# Patient Record
Sex: FEMALE | Race: WHITE | NOT HISPANIC OR LATINO | Employment: OTHER | URBAN - METROPOLITAN AREA
[De-identification: names, ages, dates, MRNs, and addresses within clinical notes are randomized per-mention and may not be internally consistent; named-entity substitution may affect disease eponyms.]

---

## 2017-09-26 ENCOUNTER — TRANSCRIBE ORDERS (OUTPATIENT)
Dept: MAMMOGRAPHY | Facility: HOSPITAL | Age: 62
End: 2017-09-26

## 2017-09-26 DIAGNOSIS — Z12.31 VISIT FOR SCREENING MAMMOGRAM: Primary | ICD-10-CM

## 2017-10-27 ENCOUNTER — APPOINTMENT (OUTPATIENT)
Dept: MAMMOGRAPHY | Facility: HOSPITAL | Age: 62
End: 2017-10-27
Attending: OBSTETRICS & GYNECOLOGY

## 2017-12-12 ENCOUNTER — APPOINTMENT (OUTPATIENT)
Dept: MAMMOGRAPHY | Facility: HOSPITAL | Age: 62
End: 2017-12-12
Attending: OBSTETRICS & GYNECOLOGY

## 2018-01-26 ENCOUNTER — APPOINTMENT (OUTPATIENT)
Dept: MAMMOGRAPHY | Facility: HOSPITAL | Age: 63
End: 2018-01-26
Attending: OBSTETRICS & GYNECOLOGY

## 2018-03-13 ENCOUNTER — HOSPITAL ENCOUNTER (OUTPATIENT)
Dept: MAMMOGRAPHY | Facility: HOSPITAL | Age: 63
Discharge: HOME | End: 2018-03-13
Attending: OBSTETRICS & GYNECOLOGY | Admitting: OBSTETRICS & GYNECOLOGY

## 2018-03-13 DIAGNOSIS — R92.8 ABNORMAL MAMMOGRAM: ICD-10-CM

## 2018-03-13 DIAGNOSIS — Z12.31 VISIT FOR SCREENING MAMMOGRAM: ICD-10-CM

## 2018-03-13 PROCEDURE — G0279 TOMOSYNTHESIS, MAMMO: HCPCS

## 2018-03-13 PROCEDURE — 77066 DX MAMMO INCL CAD BI: CPT | Performed by: RADIOLOGY

## 2018-03-13 PROCEDURE — 77066 DX MAMMO INCL CAD BI: CPT

## 2018-03-13 PROCEDURE — 77062 BREAST TOMOSYNTHESIS BI: CPT | Performed by: RADIOLOGY

## 2019-04-22 ENCOUNTER — TRANSCRIBE ORDERS (OUTPATIENT)
Dept: ADMINISTRATIVE | Facility: HOSPITAL | Age: 64
End: 2019-04-22

## 2019-04-22 DIAGNOSIS — Z12.31 VISIT FOR SCREENING MAMMOGRAM: Primary | ICD-10-CM

## 2019-06-14 ENCOUNTER — HOSPITAL ENCOUNTER (OUTPATIENT)
Dept: MAMMOGRAPHY | Facility: HOSPITAL | Age: 64
Discharge: HOME | End: 2019-06-14
Admitting: OBSTETRICS & GYNECOLOGY

## 2019-06-14 DIAGNOSIS — Z12.31 VISIT FOR SCREENING MAMMOGRAM: ICD-10-CM

## 2019-06-14 PROCEDURE — 77063 BREAST TOMOSYNTHESIS BI: CPT | Performed by: RADIOLOGY

## 2019-06-14 PROCEDURE — 77063 BREAST TOMOSYNTHESIS BI: CPT

## 2019-06-14 PROCEDURE — 77067 SCR MAMMO BI INCL CAD: CPT | Performed by: RADIOLOGY

## 2019-06-14 PROCEDURE — 77067 SCR MAMMO BI INCL CAD: CPT

## 2021-05-28 ENCOUNTER — TRANSCRIBE ORDERS (OUTPATIENT)
Dept: ADMINISTRATIVE | Facility: HOSPITAL | Age: 66
End: 2021-05-28

## 2021-05-28 ENCOUNTER — HOSPITAL ENCOUNTER (OUTPATIENT)
Dept: GENERAL RADIOLOGY | Facility: HOSPITAL | Age: 66
Discharge: HOME | End: 2021-05-28
Admitting: INTERNAL MEDICINE

## 2021-05-28 DIAGNOSIS — M54.2 CERVICALGIA: Primary | ICD-10-CM

## 2021-05-28 PROCEDURE — 72050 X-RAY EXAM NECK SPINE 4/5VWS: CPT

## 2021-06-02 ENCOUNTER — TRANSCRIBE ORDERS (OUTPATIENT)
Dept: ADMINISTRATIVE | Facility: HOSPITAL | Age: 66
End: 2021-06-02

## 2021-06-02 DIAGNOSIS — M50.90 CERVICAL DISC DISEASE: Primary | ICD-10-CM

## 2021-06-29 ENCOUNTER — HOSPITAL ENCOUNTER (OUTPATIENT)
Dept: MRI IMAGING | Facility: HOSPITAL | Age: 66
Discharge: HOME | End: 2021-06-29
Admitting: INTERNAL MEDICINE

## 2021-06-29 DIAGNOSIS — M50.90 CERVICAL DISC DISEASE: ICD-10-CM

## 2021-06-29 PROCEDURE — 72141 MRI NECK SPINE W/O DYE: CPT

## 2021-07-02 ENCOUNTER — TRANSCRIBE ORDERS (OUTPATIENT)
Dept: ADMINISTRATIVE | Facility: HOSPITAL | Age: 66
End: 2021-07-02

## 2021-07-02 ENCOUNTER — TRANSCRIBE ORDERS (OUTPATIENT)
Dept: OBSTETRICS AND GYNECOLOGY | Facility: CLINIC | Age: 66
End: 2021-07-02

## 2021-07-02 DIAGNOSIS — Z12.31 VISIT FOR SCREENING MAMMOGRAM: Primary | ICD-10-CM

## 2021-07-12 ENCOUNTER — HOSPITAL ENCOUNTER (OUTPATIENT)
Dept: MAMMOGRAPHY | Facility: HOSPITAL | Age: 66
Discharge: HOME | End: 2021-07-12
Admitting: OBSTETRICS & GYNECOLOGY

## 2021-07-12 DIAGNOSIS — Z12.31 VISIT FOR SCREENING MAMMOGRAM: ICD-10-CM

## 2021-07-12 PROCEDURE — 77063 BREAST TOMOSYNTHESIS BI: CPT

## 2021-07-12 PROCEDURE — 77063 BREAST TOMOSYNTHESIS BI: CPT | Performed by: RADIOLOGY

## 2021-07-12 PROCEDURE — 77067 SCR MAMMO BI INCL CAD: CPT

## 2021-07-12 PROCEDURE — 77067 SCR MAMMO BI INCL CAD: CPT | Performed by: RADIOLOGY

## 2021-08-02 ENCOUNTER — TELEPHONE (OUTPATIENT)
Dept: NEUROSURGERY | Facility: CLINIC | Age: 66
End: 2021-08-02

## 2021-08-02 NOTE — TELEPHONE ENCOUNTER
Provider:  Blaise  Caller: patient  Time of call:   3:17  Phone #:  [phone]  Surgery:  No NEW PATIENT  Surgery Date:    Last visit:     Next visit: tomorrow    SINGH:         Reason for call:    Patient just got back from [facility] and believes she may be positive for covid.  She was tested prior to leaving and she was negative.    She is seeing her PCP today and will be tested.

## 2021-08-20 ENCOUNTER — OFFICE VISIT (OUTPATIENT)
Dept: NEUROSURGERY | Facility: CLINIC | Age: 66
End: 2021-08-20

## 2021-08-20 VITALS — WEIGHT: 199.4 LBS | BODY MASS INDEX: 29.53 KG/M2 | TEMPERATURE: 97.1 F | HEIGHT: 69 IN

## 2021-08-20 DIAGNOSIS — M50.30 DDD (DEGENERATIVE DISC DISEASE), CERVICAL: ICD-10-CM

## 2021-08-20 DIAGNOSIS — M47.812 CERVICAL SPONDYLOSIS: ICD-10-CM

## 2021-08-20 DIAGNOSIS — M48.02 CERVICAL SPINAL STENOSIS: ICD-10-CM

## 2021-08-20 DIAGNOSIS — M54.2 NECK PAIN: Primary | ICD-10-CM

## 2021-08-20 PROCEDURE — 99203 OFFICE O/P NEW LOW 30 MIN: CPT | Performed by: NEUROLOGICAL SURGERY

## 2021-08-20 RX ORDER — MONTELUKAST SODIUM 4 MG/1
1 TABLET, CHEWABLE ORAL 3 TIMES DAILY
COMMUNITY
End: 2023-03-20

## 2021-08-20 NOTE — PROGRESS NOTES
"Patient: Talia Maddox  : 1955    Primary Care Provider: Sharda Valentine MD    Requesting Provider: As above        History    Chief Complaint: Chronic neck pain.    History of Present Illness: Ms. Maddox is a 66-year-old  who since the mid  has had bothersome neck pain.  In March she had a severe flareup that lasted overnight.  She denies any Lhermitte's phenomenon.  She has no arm symptoms including pain, weakness, sensory alteration.  She has pursued some \"magnetic therapies\" for some time.  Stress makes her symptoms worse.  She has done some recent physical therapy which was helpful.  She denies bowel or bladder dysfunction.    Review of Systems   Constitutional: Negative for activity change, appetite change, chills, diaphoresis, fatigue, fever and unexpected weight change.   HENT: Negative for congestion, dental problem, drooling, ear discharge, ear pain, facial swelling, hearing loss, mouth sores, nosebleeds, postnasal drip, rhinorrhea, sinus pressure, sinus pain, sneezing, sore throat, tinnitus, trouble swallowing and voice change.    Eyes: Positive for pain and redness. Negative for photophobia, discharge, itching and visual disturbance.   Respiratory: Negative for apnea, cough, choking, chest tightness, shortness of breath, wheezing and stridor.    Cardiovascular: Negative for chest pain, palpitations and leg swelling.   Gastrointestinal: Negative for abdominal distention, abdominal pain, anal bleeding, blood in stool, constipation, diarrhea, nausea, rectal pain and vomiting.   Endocrine: Negative for cold intolerance, heat intolerance, polydipsia, polyphagia and polyuria.   Genitourinary: Negative for decreased urine volume, difficulty urinating, dysuria, enuresis, flank pain, frequency, genital sores, hematuria and urgency.   Musculoskeletal: Positive for neck pain and neck stiffness. Negative for arthralgias, back pain, gait problem, joint swelling and myalgias. " "  Skin: Negative for color change, pallor, rash and wound.   Allergic/Immunologic: Negative for environmental allergies, food allergies and immunocompromised state.   Neurological: Negative for dizziness, tremors, seizures, syncope, facial asymmetry, speech difficulty, weakness, light-headedness, numbness and headaches.   Hematological: Negative for adenopathy. Does not bruise/bleed easily.   Psychiatric/Behavioral: Negative for agitation, behavioral problems, confusion, decreased concentration, dysphoric mood, hallucinations, self-injury, sleep disturbance and suicidal ideas. The patient is not nervous/anxious and is not hyperactive.    All other systems reviewed and are negative.      The patient's past medical history, past surgical history, family history, and social history have been reviewed at length in the electronic medical record.    Physical Exam:   Temp 97.1 °F (36.2 °C)   Ht 176.5 cm (69.49\")   Wt 90.4 kg (199 lb 6.4 oz)   BMI 29.03 kg/m²   CONSTITUTIONAL: Patient is well-nourished, pleasant and appears stated age.  CV: Heart regular rate and rhythm without murmur, rub, or gallop.  PULMONARY: Lungs are clear to ascultation.  MUSCULOSKELETAL:  Neck tenderness to palpation is not observed.   ROM in the neck is very mildly limited in all directions.  NEUROLOGICAL:  Orientation, memory, attention span, language function, and cognition have been examined and are intact.  Strength is intact in the upper and lower extremities to direct testing.  Muscle tone is normal throughout.  Station and gait are normal.  Sensation is intact to light touch testing throughout.  Deep tendon reflexes are difficult to elicit throughout.  Donita's Sign is negative bilaterally. No clonus is elicited.  Coordination is intact.      Medical Decision Making    Data Review:   (All imaging studies were personally reviewed unless stated otherwise)  MRI of the cervical spine dated 2/21/2021 demonstrates some diffuse degenerative " disc disease and spondylosis.  There is broad-based disc osteophyte at C5-6 that causes moderate canal narrowing.  The spinal cord is very slightly effaced.  There is no signal change within the cord.    Diagnosis:   1.  Cervical spondylosis with stenosis but no myelopathy.  2.  Mechanical neck pain.    Treatment Options:   I reviewed the imaging studies with the patient.  I explained the natural history of her condition.  I have explained to her what myelopathic symptoms would entail.  For now treatment should remain symptomatic.  If she develops further difficulties then I will be happy to see her in follow-up.       Diagnosis Plan   1. Neck pain     2. Cervical spondylosis     3. Cervical spinal stenosis     4. DDD (degenerative disc disease), cervical         Scribed for Jackson Welsh MD by ALEJANDRO Munoz 8/20/2021 13:49 EDT      I, Dr. Welsh, personally performed the services described in the documentation, as scribed in my presence, and it is both accurate and complete.

## 2021-09-02 ENCOUNTER — OFFICE VISIT (OUTPATIENT)
Dept: OBSTETRICS AND GYNECOLOGY | Facility: CLINIC | Age: 66
End: 2021-09-02

## 2021-09-02 VITALS
SYSTOLIC BLOOD PRESSURE: 140 MMHG | BODY MASS INDEX: 45.82 KG/M2 | HEIGHT: 55 IN | DIASTOLIC BLOOD PRESSURE: 90 MMHG | WEIGHT: 198 LBS

## 2021-09-02 DIAGNOSIS — Z01.419 WOMEN'S ANNUAL ROUTINE GYNECOLOGICAL EXAMINATION: Primary | ICD-10-CM

## 2021-09-02 DIAGNOSIS — N39.3 STRESS INCONTINENCE: ICD-10-CM

## 2021-09-02 DIAGNOSIS — Z12.31 ENCOUNTER FOR SCREENING MAMMOGRAM FOR MALIGNANT NEOPLASM OF BREAST: ICD-10-CM

## 2021-09-02 DIAGNOSIS — Z78.0 ASYMPTOMATIC MENOPAUSAL STATE: ICD-10-CM

## 2021-09-02 PROCEDURE — G0101 CA SCREEN;PELVIC/BREAST EXAM: HCPCS | Performed by: OBSTETRICS & GYNECOLOGY

## 2021-09-02 RX ORDER — NEOMYCIN SULFATE, POLYMYXIN B SULFATE, AND DEXAMETHASONE 3.5; 10000; 1 MG/G; [USP'U]/G; MG/G
OINTMENT OPHTHALMIC
COMMUNITY
Start: 2021-08-25 | End: 2023-03-20

## 2021-09-02 RX ORDER — VALACYCLOVIR HYDROCHLORIDE 1 G/1
1000 TABLET, FILM COATED ORAL DAILY
Qty: 5 TABLET | Refills: 0 | Status: SHIPPED | OUTPATIENT
Start: 2021-09-02 | End: 2021-09-07

## 2021-09-02 NOTE — PROGRESS NOTES
GYN Annual Exam     CC - Here for annual exam.     Subjective   HPI  Talia Maddox is a 66 y.o. female, , who presents for annual well woman exam.  She is postmenopausal. She is not on hormone replacement therapy. Patient reports problems with: HSV outbreak.  Partner Status: Marital Status: .  New Partners since last visit: no Desires STD Screening: no  Trouble with her colitis and arthritis this year, working on improving diet, exercise.     Last mammogram: wnl   Last Completed Mammogram          Ordered - MAMMOGRAM (Every 2 Years) Ordered on 2021  Mammo Screening Digital Tomosynthesis Bilateral With CAD    2019  Mammo Screening Digital Tomosynthesis Bilateral With CAD    2018  Mammo Diagnostic Digital Tomosynthesis Bilateral With CAD    10/27/2016  Mammo Diagnostic Digital Tomosynthesis Bilateral With CAD    10/27/2015  Mammo screening bilateral    Only the first 5 history entries have been loaded, but more history exists.              Last colonoscopy: ; lymphocytic colitis  Last Completed Colonoscopy     This patient has no relevant Health Maintenance data.        Last DEXA: 5/15; osteopenia   Last Pap : 3/20; neg.   Last Completed Pap Smear     This patient has no relevant Health Maintenance data.        History of abnormal Pap smear: no    Additional OB/GYN History   Family history of uterine, colon, breast, or ovarian cancer: yes - sister   Performs monthly Self-Breast Exam: yes - .  Exercises Regularly: yes - .  Feelings of Anxiety or Depression: yes - prescribed Lexapro     Tobacco Usage?: No   OB History        3    Para   3    Term   3            AB        Living           SAB        TAB        Ectopic        Molar        Multiple        Live Births                    Health Maintenance   Topic Date Due   • DXA SCAN  Never done   • ZOSTER VACCINE (1 of 2) Never done   • HEPATITIS C SCREENING  Never done   • ANNUAL WELLNESS VISIT  Never done  "  • Pneumococcal Vaccine 65+ (1 of 1 - PPSV23) Never done   • INFLUENZA VACCINE  10/01/2021   • MAMMOGRAM  07/12/2023   • TDAP/TD VACCINES (2 - Td or Tdap) 05/23/2028   • COLORECTAL CANCER SCREENING  08/23/2029   • COVID-19 Vaccine  Completed       The additional following portions of the patient's history were reviewed and updated as appropriate: allergies, current medications, past family history, past medical history, past social history, past surgical history and problem list.    Review of Systems   Constitutional: Negative.    HENT: Negative.    Eyes: Negative.    Respiratory: Negative.    Cardiovascular: Negative.    Gastrointestinal: Negative.    Endocrine: Negative.    Genitourinary: Positive for genital sores.   Musculoskeletal: Negative.    Skin: Negative.    Allergic/Immunologic: Negative.    Neurological: Negative.    Hematological: Negative.    Psychiatric/Behavioral: Negative.        I have reviewed and agree with the HPI, ROS, and historical information as entered above. Alis Pedersen MD    Objective   /90   Ht 100.3 cm (39.5\")   Wt 89.8 kg (198 lb)   BMI 89.22 kg/m²     Physical Exam  Vitals and nursing note reviewed. Exam conducted with a chaperone present.   Constitutional:       Appearance: She is well-developed.   HENT:      Head: Normocephalic and atraumatic.   Eyes:      Pupils: Pupils are equal, round, and reactive to light.   Neck:      Thyroid: No thyroid mass or thyromegaly.   Pulmonary:      Effort: Pulmonary effort is normal. No retractions.   Chest:      Chest wall: No mass.      Breasts:         Right: Normal. No mass, nipple discharge, skin change or tenderness.         Left: Normal. No mass, nipple discharge, skin change or tenderness.   Abdominal:      General: Bowel sounds are normal.      Palpations: Abdomen is soft. Abdomen is not rigid. There is no mass.      Tenderness: There is no abdominal tenderness. There is no guarding.      Hernia: No hernia is present. There is " no hernia in the left inguinal area or right inguinal area.   Genitourinary:     Exam position: Lithotomy position.      Pubic Area: No rash.       Labia:         Right: No rash, tenderness or lesion.         Left: No rash, tenderness or lesion.       Urethra: No urethral pain or urethral swelling.      Vagina: Normal. No vaginal discharge or lesions.      Cervix: No cervical motion tenderness, discharge, lesion or cervical bleeding.      Uterus: Normal. Not enlarged, not fixed and not tender.       Adnexa:         Right: No mass, tenderness or fullness.          Left: No mass, tenderness or fullness.        Rectum: No external hemorrhoid.   Musculoskeletal:      Cervical back: Normal range of motion. No muscular tenderness.      Right lower leg: No edema.      Left lower leg: No edema.   Skin:     General: Skin is warm and dry.   Neurological:      Mental Status: She is alert and oriented to person, place, and time.      Motor: Motor function is intact.   Psychiatric:         Mood and Affect: Mood and affect normal.         Behavior: Behavior normal.     HSV ulcerated lesion on right perienum/buttock      Assessment/Plan     Encounter Diagnoses   Name Primary?   • Stress incontinence    • Women's annual routine gynecological examination Yes   • Encounter for screening mammogram for malignant neoplasm of breast     • Asymptomatic menopausal state         Recommended use of Vitamin D replacement and getting adequate calcium in her diet. (1500mg).     Reviewed HPV guidelines  Reviewed monthly self breast exams.  Instructed to call with lumps, pain, or breast discharge.  Yearly mammograms ordered.  RTC in 1 year or PRN with problems    Alis Pedersen MD   09/02/2021

## 2022-01-10 ENCOUNTER — HOSPITAL ENCOUNTER (OUTPATIENT)
Dept: BONE DENSITY | Facility: HOSPITAL | Age: 67
Discharge: HOME | End: 2022-01-10
Admitting: OBSTETRICS & GYNECOLOGY

## 2022-01-10 DIAGNOSIS — Z01.419 WOMEN'S ANNUAL ROUTINE GYNECOLOGICAL EXAMINATION: ICD-10-CM

## 2022-01-10 DIAGNOSIS — Z78.0 ASYMPTOMATIC MENOPAUSAL STATE: ICD-10-CM

## 2022-01-10 PROCEDURE — 77080 DXA BONE DENSITY AXIAL: CPT

## 2022-04-20 ENCOUNTER — TELEPHONE (OUTPATIENT)
Dept: OBSTETRICS AND GYNECOLOGY | Facility: CLINIC | Age: 67
End: 2022-04-20

## 2022-04-20 NOTE — TELEPHONE ENCOUNTER
PT THOUGHT SHE HAD ONE REFILL ON VALTREX BUT IT ACTUALLY , SHES FLYING OUT OF TOWN TOMORROW AND IS HAVING AN ACTIVE OUTBREAK. PER PT IT WAS 1GM DAILY

## 2022-04-22 RX ORDER — VALACYCLOVIR HYDROCHLORIDE 1 G/1
1000 TABLET, FILM COATED ORAL DAILY
Qty: 5 TABLET | Refills: 0 | Status: SHIPPED | OUTPATIENT
Start: 2022-04-22 | End: 2022-04-27

## 2022-04-22 NOTE — TELEPHONE ENCOUNTER
Rx was not sent to the Rx pool. Reached out to the patient and she requested that it be sent to the [facility] in [place]. Offered to send to a pharmacy near where she is, but she will back tomorrow. Informed this has now been sent and I apologized for the delay.

## 2022-05-25 PROCEDURE — U0005 INFEC AGEN DETEC AMPLI PROBE: HCPCS | Performed by: NURSE PRACTITIONER

## 2022-05-25 PROCEDURE — U0004 COV-19 TEST NON-CDC HGH THRU: HCPCS | Performed by: NURSE PRACTITIONER

## 2022-06-01 ENCOUNTER — TELEPHONE (OUTPATIENT)
Dept: OBSTETRICS AND GYNECOLOGY | Facility: CLINIC | Age: 67
End: 2022-06-01

## 2022-06-01 NOTE — TELEPHONE ENCOUNTER
Pt wants to speak to a nurse to see if she needs to have a mammogram or not, she said she is confused by something in her MyChart

## 2022-06-01 NOTE — TELEPHONE ENCOUNTER
S/w patient- patient wondering if she needs a mammogram this year. Informed patient that  recommends yearly mammograms. Informed patient that the order is in the chart and that she will need to call and schedule this. Patient v/u.

## 2022-08-31 ENCOUNTER — TRANSCRIBE ORDERS (OUTPATIENT)
Dept: ADMINISTRATIVE | Facility: HOSPITAL | Age: 67
End: 2022-08-31

## 2022-08-31 DIAGNOSIS — Z12.31 VISIT FOR SCREENING MAMMOGRAM: Primary | ICD-10-CM

## 2022-09-29 ENCOUNTER — HOSPITAL ENCOUNTER (OUTPATIENT)
Dept: MAMMOGRAPHY | Facility: HOSPITAL | Age: 67
Discharge: HOME | End: 2022-09-29
Admitting: OBSTETRICS & GYNECOLOGY

## 2022-09-29 DIAGNOSIS — Z12.31 VISIT FOR SCREENING MAMMOGRAM: ICD-10-CM

## 2022-09-29 PROCEDURE — 77067 SCR MAMMO BI INCL CAD: CPT | Performed by: RADIOLOGY

## 2022-09-29 PROCEDURE — 77063 BREAST TOMOSYNTHESIS BI: CPT

## 2022-09-29 PROCEDURE — 77067 SCR MAMMO BI INCL CAD: CPT

## 2022-09-29 PROCEDURE — 77063 BREAST TOMOSYNTHESIS BI: CPT | Performed by: RADIOLOGY

## 2023-01-12 ENCOUNTER — TELEPHONE (OUTPATIENT)
Dept: OBSTETRICS AND GYNECOLOGY | Facility: CLINIC | Age: 68
End: 2023-01-12

## 2023-01-12 NOTE — TELEPHONE ENCOUNTER
Provider: DR APARICIO  Caller: SUSIE CONTRERAS  Relationship to Patient: SELF  Phone Number: [phone]  Reason for Call: SAME DAY CANCELLATION//PATIENT ISNT FEELING WELL//RESCHEDULED FOR FIRST AVAL

## 2023-03-07 ENCOUNTER — TELEPHONE (OUTPATIENT)
Dept: OBSTETRICS AND GYNECOLOGY | Facility: CLINIC | Age: 68
End: 2023-03-07
Payer: MEDICARE

## 2023-03-07 RX ORDER — VALACYCLOVIR HYDROCHLORIDE 1 G/1
1000 TABLET, FILM COATED ORAL DAILY
Qty: 5 TABLET | Refills: 0 | Status: SHIPPED | OUTPATIENT
Start: 2023-03-07 | End: 2023-03-12

## 2023-03-07 NOTE — TELEPHONE ENCOUNTER
Per SAEID Rodriguez: go ahead and send in RX.     RX sent to pharmacy.     lvom to let pt. Know medication has been sent.

## 2023-03-07 NOTE — TELEPHONE ENCOUNTER
Pt calling, states she is having a herpes breakout and she is requesting a refill of the oral medication she usually takes for it    I verified pharmacy - [facility]

## 2023-03-20 ENCOUNTER — OFFICE VISIT (OUTPATIENT)
Dept: OBSTETRICS AND GYNECOLOGY | Facility: CLINIC | Age: 68
End: 2023-03-20
Payer: MEDICARE

## 2023-03-20 VITALS
DIASTOLIC BLOOD PRESSURE: 74 MMHG | SYSTOLIC BLOOD PRESSURE: 120 MMHG | WEIGHT: 209 LBS | BODY MASS INDEX: 29.92 KG/M2 | HEIGHT: 70 IN

## 2023-03-20 DIAGNOSIS — N39.492 POSTURAL URINARY INCONTINENCE: ICD-10-CM

## 2023-03-20 DIAGNOSIS — Z01.419 WOMEN'S ANNUAL ROUTINE GYNECOLOGICAL EXAMINATION: Primary | ICD-10-CM

## 2023-03-20 DIAGNOSIS — Z78.0 MENOPAUSE: ICD-10-CM

## 2023-03-20 PROCEDURE — G0101 CA SCREEN;PELVIC/BREAST EXAM: HCPCS | Performed by: OBSTETRICS & GYNECOLOGY

## 2023-03-20 PROCEDURE — 3015F CERV CANCER SCREEN DOCD: CPT | Performed by: OBSTETRICS & GYNECOLOGY

## 2023-03-20 PROCEDURE — 1159F MED LIST DOCD IN RCRD: CPT | Performed by: OBSTETRICS & GYNECOLOGY

## 2023-03-20 PROCEDURE — 1160F RVW MEDS BY RX/DR IN RCRD: CPT | Performed by: OBSTETRICS & GYNECOLOGY

## 2023-03-20 RX ORDER — LEVOTHYROXINE SODIUM 112 UG/1
1 TABLET ORAL DAILY
COMMUNITY
Start: 2022-12-20

## 2023-03-20 NOTE — PROGRESS NOTES
Gynecologic Annual Exam Note        GYN Annual Exam     CC - Here for annual exam.     Subjective     HPI  Talia Maddox is a 68 y.o. female, , who presents for annual well woman exam as a(n) established patient.  She is postmenopausal.  Patient denies vaginal bleeding. ..  Patient reports problems with: recent HSV outbreak in the groin area. She took Valtrex last week and it resolved. She also reports a h/o colitis and has been feeling a rectal protrusion. She reports a lot of straining with BM's. She understands this is not our area of expertise but would like to have Dr. Daniel input.  Pt. reports mild urinary incontinence. There were no changes to her medical or surgical history since her last visit.. Partner Status: Marital Status: .  She is not currently sexually active. STD testing recommendations have been explained to the patient and she does not desire STD testing.    Additional OB/GYN History     On HRT? No    Last Pap : 3/2/2020. Results: negative. HPV: not done.     Last Completed Pap Smear     This patient has no relevant Health Maintenance data.        History of abnormal Pap smear: no  Family history of uterine, colon, breast, or ovarian cancer: yes - breast cancer in her sister  Performs monthly Self-Breast Exam: no  Last mammogram: 22. Done at .    Last Completed Mammogram          MAMMOGRAM (Every 2 Years) Next due on 2022  Mammo Screening Digital Tomosynthesis Bilateral With CAD    2021  Mammo Screening Digital Tomosynthesis Bilateral With CAD    2019  Mammo Screening Digital Tomosynthesis Bilateral With CAD    2018  Mammo Diagnostic Digital Tomosynthesis Bilateral With CAD    10/27/2016  Mammo Diagnostic Digital Tomosynthesis Bilateral With CAD    Only the first 5 history entries have been loaded, but more history exists.              Last colonoscopy: has had a colonoscopy 2 year(s) ago.    Last Completed Colonoscopy           COLORECTAL CANCER SCREENING (COLONOSCOPY - Every 10 Years) Next due on 2019  Outside Claim: [place] COLONOSCOPY W/BIOPSY SINGLE/MULTIPLE              Last DEXA: On 2022 and results were Osteopenia  Exercises Regularly: no  Feelings of Anxiety or Depression: yes - she is seeing someone for treatment      Tobacco Usage?: No       Current Outpatient Medications:   •  celecoxib (CeleBREX) 100 MG capsule, Take 100 mg by mouth Daily., Disp: , Rfl:   •  escitalopram (LEXAPRO) 20 MG tablet, Take 20 mg by mouth Daily., Disp: , Rfl:   •  levothyroxine (SYNTHROID, LEVOTHROID) 112 MCG tablet, Take 1 tablet by mouth Daily., Disp: , Rfl:   •  traZODone (DESYREL) 50 MG tablet, Take 50 mg by mouth Every Night., Disp: , Rfl:     Patient denies the need for medication refills today.    OB History        3    Para   3    Term   3            AB        Living   3       SAB        IAB        Ectopic        Molar        Multiple        Live Births                    Past Medical History:   Diagnosis Date   • Arthritis    • Depression    • HSV infection    • Lymphocytic colitis    • Osteoarthritis    • Thyroid disease         Past Surgical History:   Procedure Laterality Date   • APPENDECTOMY     • BREAST BIOPSY Left 2015    stereo bx   •  SECTION         Health Maintenance   Topic Date Due   • ZOSTER VACCINE (1 of 2) Never done   • HEPATITIS C SCREENING  Never done   • ANNUAL WELLNESS VISIT  Never done   • PAP SMEAR  Never done   • Pneumococcal Vaccine 65+ (1 - PCV) Never done   • COVID-19 Vaccine (3 - Booster for Pfizer series) 2021   • INFLUENZA VACCINE  2022   • DXA SCAN  01/10/2024   • MAMMOGRAM  2024   • TDAP/TD VACCINES (2 - Td or Tdap) 2028   • COLORECTAL CANCER SCREENING  2029       The additional following portions of the patient's history were reviewed and updated as appropriate: allergies, current medications, past family history, past medical  "history, past social history, past surgical history and problem list.    Review of Systems   Constitutional: Negative.    HENT: Negative.    Eyes: Negative.    Respiratory: Negative.    Cardiovascular: Negative.    Gastrointestinal: Negative.    Endocrine: Negative.    Genitourinary: Negative.    Musculoskeletal: Negative.    Skin: Negative.    Allergic/Immunologic: Negative.    Neurological: Negative.    Hematological: Negative.    Psychiatric/Behavioral: Negative.          I have reviewed and agree with the HPI, ROS, and historical information as entered above. Alis Pedersen MD       Objective   /74   Ht 176.5 cm (69.5\")   Wt 94.8 kg (209 lb)   BMI 30.42 kg/m²     Physical Exam  Vitals and nursing note reviewed. Exam conducted with a chaperone present.   Constitutional:       Appearance: She is well-developed.   HENT:      Head: Normocephalic and atraumatic.   Eyes:      Pupils: Pupils are equal, round, and reactive to light.   Neck:      Thyroid: No thyroid mass or thyromegaly.   Pulmonary:      Effort: Pulmonary effort is normal. No retractions.   Chest:      Chest wall: No mass.   Breasts:     Right: Normal. No mass, nipple discharge, skin change or tenderness.      Left: Normal. No mass, nipple discharge, skin change or tenderness.   Abdominal:      General: Bowel sounds are normal.      Palpations: Abdomen is soft. Abdomen is not rigid. There is no mass.      Tenderness: There is no abdominal tenderness. There is no guarding.      Hernia: No hernia is present. There is no hernia in the left inguinal area or right inguinal area.   Genitourinary:     Exam position: Lithotomy position.      Pubic Area: No rash.       Labia:         Right: No rash, tenderness or lesion.         Left: No rash, tenderness or lesion.       Urethra: No urethral pain or urethral swelling.      Vagina: Normal. No vaginal discharge or lesions.      Cervix: No cervical motion tenderness, discharge, lesion or cervical bleeding. "      Uterus: Normal. Not enlarged, not fixed and not tender.       Adnexa:         Right: No mass, tenderness or fullness.          Left: No mass, tenderness or fullness.        Rectum: No external hemorrhoid.   Musculoskeletal:      Cervical back: Normal range of motion. No muscular tenderness.      Right lower leg: No edema.      Left lower leg: No edema.   Skin:     General: Skin is warm and dry.   Neurological:      Mental Status: She is alert and oriented to person, place, and time.      Motor: Motor function is intact.   Psychiatric:         Mood and Affect: Mood and affect normal.         Behavior: Behavior normal.            Assessment and Plan    Problem List Items Addressed This Visit    None  Visit Diagnoses     Women's annual routine gynecological examination    -  Primary    Relevant Orders    LIQUID-BASED PAP SMEAR WITH HPV GENOTYPING IF ASCUS ([facility])    Postural urinary incontinence        Menopause              1. GYN annual well woman exam.   2. Reviewed monthly self breast exams.  Instructed to call with lumps, pain, or breast discharge.  Yearly mammograms ordered.  3. Recommended use of Vitamin D and getting adequate calcium in her diet. (1500mg)  4. Reviewed exercise as a preventative health measures.   5. Recommended Flu Vaccine in Fall of each year.  6. RTC in 1 year or PRN with problems.  Return in about 1 year (around 3/20/2024) for Annual physical.    Alis Pedersen MD  03/20/2023

## 2023-03-21 LAB — REF LAB TEST METHOD: NORMAL

## 2023-04-07 ENCOUNTER — TELEPHONE (OUTPATIENT)
Dept: OBSTETRICS AND GYNECOLOGY | Facility: CLINIC | Age: 68
End: 2023-04-07
Payer: MEDICARE

## 2023-04-07 RX ORDER — VALACYCLOVIR HYDROCHLORIDE 1 G/1
1000 TABLET, FILM COATED ORAL 2 TIMES DAILY
Qty: 5 TABLET | Refills: 0 | Status: SHIPPED | OUTPATIENT
Start: 2023-04-07

## 2023-04-07 NOTE — TELEPHONE ENCOUNTER
Spoke to patient, she states that she is currently experiencing a HSV outbreak and would like Valtrex to be sent to pharmacy. Sent prescription and patient advised.

## 2023-08-16 ENCOUNTER — TELEPHONE (OUTPATIENT)
Dept: OBSTETRICS AND GYNECOLOGY | Facility: CLINIC | Age: 68
End: 2023-08-16
Payer: MEDICARE

## 2023-08-16 RX ORDER — VALACYCLOVIR HYDROCHLORIDE 1 G/1
1000 TABLET, FILM COATED ORAL 2 TIMES DAILY
Qty: 10 TABLET | Refills: 2 | Status: SHIPPED | OUTPATIENT
Start: 2023-08-16

## 2023-08-16 NOTE — TELEPHONE ENCOUNTER
Pt. Reports genital outbreak. States she would like to discuss options for prevention of spreading with Dr. Pedersen, offered appointment but she does not feel like it is needed at this time. Refilled from her last visit.

## 2024-03-12 NOTE — TELEPHONE ENCOUNTER
Caller: Talia Maddox    Relationship: Self    Best call back number: [phone]    Requested Prescriptions:   Requested Prescriptions     Pending Prescriptions Disp Refills    valACYclovir (Valtrex) 1000 MG tablet 10 tablet 2     Sig: Take 1 tablet by mouth 2 (Two) Times a Day.        Pharmacy where request should be sent: ON FILE    Last office visit with prescribing clinician: 3/20/2023   Last telemedicine visit with prescribing clinician: Visit date not found   Next office visit with prescribing clinician: 5/21/2024     Does the patient have less than a 3 day supply:  [x] Yes  [] No    Would you like a call back once the refill request has been completed: [x] Yes [] No    If the office needs to give you a call back, can they leave a voicemail: [x] Yes [] No

## 2024-03-13 RX ORDER — VALACYCLOVIR HYDROCHLORIDE 1 G/1
1000 TABLET, FILM COATED ORAL 2 TIMES DAILY
Qty: 10 TABLET | Refills: 2 | Status: SHIPPED | OUTPATIENT
Start: 2024-03-13

## 2024-07-31 ENCOUNTER — HOSPITAL ENCOUNTER (OUTPATIENT)
Dept: GENERAL RADIOLOGY | Facility: HOSPITAL | Age: 69
Discharge: HOME | End: 2024-07-31
Admitting: INTERNAL MEDICINE
Payer: MEDICARE

## 2024-07-31 ENCOUNTER — TRANSCRIBE ORDERS (OUTPATIENT)
Dept: GENERAL RADIOLOGY | Facility: HOSPITAL | Age: 69
End: 2024-07-31
Payer: MEDICARE

## 2024-07-31 DIAGNOSIS — M54.31 RIGHT SIDED SCIATICA: Primary | ICD-10-CM

## 2024-07-31 DIAGNOSIS — M54.31 RIGHT SIDED SCIATICA: ICD-10-CM

## 2024-07-31 PROCEDURE — 73502 X-RAY EXAM HIP UNI 2-3 VIEWS: CPT

## 2024-08-07 ENCOUNTER — TRANSCRIBE ORDERS (OUTPATIENT)
Dept: ADMINISTRATIVE | Facility: HOSPITAL | Age: 69
End: 2024-08-07
Payer: MEDICARE

## 2024-08-07 DIAGNOSIS — M16.11 OSTEOARTHRITIS OF RIGHT HIP, UNSPECIFIED OSTEOARTHRITIS TYPE: Primary | ICD-10-CM

## 2024-09-03 ENCOUNTER — HOSPITAL ENCOUNTER (OUTPATIENT)
Dept: MRI IMAGING | Facility: HOSPITAL | Age: 69
Discharge: HOME | End: 2024-09-03
Admitting: INTERNAL MEDICINE
Payer: MEDICARE

## 2024-09-03 DIAGNOSIS — M16.11 OSTEOARTHRITIS OF RIGHT HIP, UNSPECIFIED OSTEOARTHRITIS TYPE: ICD-10-CM

## 2024-09-03 PROCEDURE — 73721 MRI JNT OF LWR EXTRE W/O DYE: CPT

## 2024-09-16 ENCOUNTER — TRANSCRIBE ORDERS (OUTPATIENT)
Dept: ADMINISTRATIVE | Facility: HOSPITAL | Age: 69
End: 2024-09-16
Payer: MEDICARE

## 2024-09-16 DIAGNOSIS — Z12.31 VISIT FOR SCREENING MAMMOGRAM: Primary | ICD-10-CM

## 2024-09-26 ENCOUNTER — TELEPHONE (OUTPATIENT)
Dept: OBSTETRICS AND GYNECOLOGY | Facility: CLINIC | Age: 69
End: 2024-09-26

## 2024-09-26 NOTE — TELEPHONE ENCOUNTER
Caller:SUSIE CONTRERAS    Best call back number:[phone]    PATIENT CALLED REQUESTING TO CANCEL SAME DAY APPT.    Did the patient call AFTER the start time of their scheduled appointment?  NO    Was the patient's appointment rescheduled?  YES

## 2024-10-22 ENCOUNTER — OFFICE VISIT (OUTPATIENT)
Dept: NEUROSURGERY | Facility: CLINIC | Age: 69
End: 2024-10-22
Payer: MEDICARE

## 2024-10-22 VITALS — HEIGHT: 70 IN | TEMPERATURE: 97.5 F | WEIGHT: 203.3 LBS | BODY MASS INDEX: 29.11 KG/M2

## 2024-10-22 DIAGNOSIS — M51.362 DEGENERATION OF INTERVERTEBRAL DISC OF LUMBAR REGION WITH DISCOGENIC BACK PAIN AND LOWER EXTREMITY PAIN: Primary | ICD-10-CM

## 2024-10-22 DIAGNOSIS — M79.602 LEFT ARM PAIN: ICD-10-CM

## 2024-10-22 DIAGNOSIS — M48.061 SPINAL STENOSIS OF LUMBAR REGION AT MULTIPLE LEVELS: ICD-10-CM

## 2024-10-22 DIAGNOSIS — M48.02 SPINAL STENOSIS IN CERVICAL REGION: ICD-10-CM

## 2024-10-22 PROCEDURE — 1159F MED LIST DOCD IN RCRD: CPT | Performed by: NEUROLOGICAL SURGERY

## 2024-10-22 PROCEDURE — 99203 OFFICE O/P NEW LOW 30 MIN: CPT | Performed by: NEUROLOGICAL SURGERY

## 2024-10-22 PROCEDURE — 1160F RVW MEDS BY RX/DR IN RCRD: CPT | Performed by: NEUROLOGICAL SURGERY

## 2024-10-22 RX ORDER — AMITRIPTYLINE HYDROCHLORIDE 10 MG/1
10 TABLET ORAL NIGHTLY
COMMUNITY

## 2024-10-22 NOTE — PROGRESS NOTES
Patient: Talia Maddox  : 1955    Primary Care Provider: Sharda Valentine MD    Requesting Provider: As above        History    Chief Complaint:   1.  Neck and left upper extremity pain.  2.  Low back and bilateral hip pain.    History of Present Illness: Ms. Maddox is a 69-year-old  has had some neck pain since the .  I last saw her in 2021 with mechanical neck pain.  Since the spring of this year she has had increased neck pain that now extends into the left arm.  She has had axillary symptoms.  She has had sensory alteration involving the index finger and thumb of the left hand.  She received a steroid injection and has been treated with oral steroids.  She continues to have a dysesthetic sensation involving her left arm.  More recently she developed severe pain in her low back and hips.  This is worse with twisting or changing position type of activities.  The symptoms began in July or August.  She has developed knifelike symptoms into her buttocks, now left greater than right.  She is not describing clear claudication type symptoms.    Review of Systems   Constitutional:  Positive for activity change. Negative for appetite change, chills, diaphoresis, fatigue, fever and unexpected weight change.   HENT:  Negative for congestion, dental problem, drooling, ear discharge, ear pain, facial swelling, hearing loss, mouth sores, nosebleeds, postnasal drip, rhinorrhea, sinus pressure, sinus pain, sneezing, sore throat, tinnitus, trouble swallowing and voice change.    Eyes:  Negative for photophobia, pain, discharge, redness, itching and visual disturbance.   Respiratory:  Negative for apnea, cough, choking, chest tightness, shortness of breath, wheezing and stridor.    Cardiovascular:  Negative for chest pain, palpitations and leg swelling.   Gastrointestinal:  Negative for abdominal distention, abdominal pain, anal bleeding, blood in stool, constipation, diarrhea, nausea,  "rectal pain and vomiting.   Endocrine: Negative for cold intolerance, heat intolerance, polydipsia, polyphagia and polyuria.   Genitourinary:  Negative for decreased urine volume, difficulty urinating, dyspareunia, dysuria, enuresis, flank pain, frequency, genital sores, hematuria, menstrual problem, pelvic pain, urgency, vaginal bleeding, vaginal discharge and vaginal pain.   Musculoskeletal:  Positive for back pain, neck pain and neck stiffness. Negative for arthralgias, gait problem, joint swelling and myalgias.   Skin:  Negative for color change, pallor, rash and wound.   Allergic/Immunologic: Negative for environmental allergies, food allergies and immunocompromised state.   Neurological:  Positive for dizziness and numbness. Negative for tremors, seizures, syncope, facial asymmetry, speech difficulty, weakness, light-headedness and headaches.   Hematological:  Negative for adenopathy. Does not bruise/bleed easily.   Psychiatric/Behavioral:  Positive for sleep disturbance. Negative for agitation, behavioral problems, confusion, decreased concentration, dysphoric mood, hallucinations, self-injury and suicidal ideas. The patient is not nervous/anxious and is not hyperactive.      The patient's past medical history, past surgical history, family history, and social history have been reviewed at length in the electronic medical record.      Physical Exam:   Temp 97.5 °F (36.4 °C) (Infrared)   Ht 176.5 cm (69.5\")   Wt 92.2 kg (203 lb 4.8 oz)   LMP  (LMP Unknown)   BMI 29.59 kg/m²   CONSTITUTIONAL: Patient is well-nourished, pleasant and appears stated age.  MUSCULOSKELETAL:  Neck tenderness to palpation is not observed.   ROM in the neck is normal.  Straight leg raising is negative.  Sterling signs are negative.  NEUROLOGICAL:  Orientation, memory, attention span, language function, and cognition have been examined and are intact.  Strength is intact in the upper and lower extremities to direct testing.  Muscle " tone is normal throughout.  Station and gait are normal.  Sensation is intact to light touch testing throughout.  Deep tendon reflexes are 1+ and symmetrical.  Donita's Sign is negative bilaterally. No clonus is elicited.  Coordination is intact.      Medical Decision Making    Data Review:   (All imaging studies were personally reviewed unless stated otherwise)  MRI of the cervical spine dated 9/14/2024 demonstrates the previously noted spondylosis at C5-6.  There are chronic endplate changes as well as broad-based and central disc-osteophyte that causes severe narrowing.  There is no signal change within the cord.  These findings have progressed somewhat compared to the MRI study from June 2021.    MRI of the lumbar spine dated 9/14/2024 demonstrates diffuse degenerative disc disease and facet arthropathy.  There is high-grade stenosis at L3-4 and L4-5.    Diagnosis:   1.  Cervical spondylosis with radiculopathy.  Despite the impressive MRI imaging she does not currently have findings that suggest a myelopathy.  2.  Mechanical low back pain.  3.  Lumbar stenosis without overt neurogenic claudication.    Treatment Options:   The patient is going to attend physical therapy with traction.  She is going to do either therapy on her low back or perhaps some chiropractic.  I will see her in follow-up.  We may be at the point where she needs to have an ACDF done at C5-6 given her radicular symptoms and progressive degeneration.      Scribed for Jackson Welsh MD by Laura Hewitt CMA. 10/22/2024 12:31 EDT    I, Dr. Welsh, personally performed the services described in the documentation, as scribed in my presence, and it is both accurate and complete.

## 2024-10-25 ENCOUNTER — OFFICE VISIT (OUTPATIENT)
Dept: OBSTETRICS AND GYNECOLOGY | Facility: CLINIC | Age: 69
End: 2024-10-25
Payer: MEDICARE

## 2024-10-25 VITALS — DIASTOLIC BLOOD PRESSURE: 92 MMHG | BODY MASS INDEX: 29.55 KG/M2 | WEIGHT: 203 LBS | SYSTOLIC BLOOD PRESSURE: 126 MMHG

## 2024-10-25 DIAGNOSIS — Z12.4 SCREENING FOR CERVICAL CANCER: Primary | ICD-10-CM

## 2024-10-25 DIAGNOSIS — Z01.419 WOMEN'S ANNUAL ROUTINE GYNECOLOGICAL EXAMINATION: ICD-10-CM

## 2024-10-25 DIAGNOSIS — Z12.31 ENCOUNTER FOR SCREENING MAMMOGRAM FOR MALIGNANT NEOPLASM OF BREAST: ICD-10-CM

## 2024-10-25 DIAGNOSIS — R32 URINARY INCONTINENCE, UNSPECIFIED TYPE: ICD-10-CM

## 2024-10-25 NOTE — PROGRESS NOTES
Postmenopausal visit    Chief Complaint   Patient presents with    Gynecologic Exam        Subjective     HPI  Talia Maddox is a 69 y.o. female, , who presents as a(n) established patient. She is postmenopausal. Since her last visit the patient was diagnosed with sciatica . Marital Status: .  She is not currently sexually active. STD testing recommendations have been explained to the patient and she {declines STD testing.    Patient reports problems with:  none .  Pt. reports mild urinary incontinence.   Soniya is getting  this year.     Additional OB/GYN History     On HRT? No    Last Pap : 3/20/23. Results: negative. HPV: unknown     Last Completed Pap Smear            PAP SMEAR (Every 2 Years) Next due on 3/20/2025      2023  LIQUID-BASED PAP SMEAR WITH HPV GENOTYPING IF ASCUS ([facility])                  History of abnormal Pap smear: no  Family history of uterine, colon, breast, or ovarian cancer: yes - Sister- BC  Performs monthly Self-Breast Exam: no  Last mammogram: 22. Done at [facility].    Last Completed Mammogram       This patient has no relevant Health Maintenance data.          Last colonoscopy: has had a colonoscopy 5 years ago    Last Completed Colonoscopy            COLORECTAL CANCER SCREENING (COLONOSCOPY - Every 10 Years) Next due on 2019  Outside Claim: DE COLONOSCOPY W/BIOPSY SINGLE/MULTIPLE                  Her last bone density scan was 1/10/22 ago and results were Osteopenia  Exercises Regularly: walking  Feelings of Anxiety or Depression:  managing     Tobacco Usage?: No       Current Outpatient Medications:     amitriptyline (ELAVIL) 10 MG tablet, Take 1 tablet by mouth Every Night., Disp: , Rfl:     celecoxib (CeleBREX) 100 MG capsule, Take 1 capsule by mouth Daily., Disp: , Rfl:     escitalopram (LEXAPRO) 20 MG tablet, Take 1 tablet by mouth Daily., Disp: , Rfl:     traZODone (DESYREL) 50 MG tablet, Take 1 tablet by  mouth Every Night., Disp: , Rfl:     valACYclovir (Valtrex) 1000 MG tablet, Take 1 tablet by mouth 2 (Two) Times a Day., Disp: 10 tablet, Rfl: 2    levothyroxine (SYNTHROID, LEVOTHROID) 112 MCG tablet, Take 1 tablet by mouth Daily., Disp: , Rfl:     Patient is requesting refills of valtrex.    OB History          3    Para   3    Term   3            AB        Living   3         SAB        IAB        Ectopic        Molar        Multiple        Live Births                    Past Medical History:   Diagnosis Date    Arthritis     Depression     HSV infection     Lymphocytic colitis     Osteoarthritis     Sciatica     Thyroid disease         Past Surgical History:   Procedure Laterality Date    APPENDECTOMY  1987    BREAST BIOPSY Left 2015    stereo bx     SECTION         Health Maintenance   Topic Date Due    MOST FORM  Never done    HEPATITIS C SCREENING  Never done    ANNUAL WELLNESS VISIT  Never done    Pneumococcal Vaccine 65+ (1 of 1 - PCV) Never done    DXA SCAN  01/10/2024    INFLUENZA VACCINE  2024    COVID-19 Vaccine (3 -  season) 2024    MAMMOGRAM  2024    ZOSTER VACCINE (2 of 2) 2024    PAP SMEAR  2025    BMI FOLLOWUP  10/22/2025    TDAP/TD VACCINES (2 - Td or Tdap) 2028    COLORECTAL CANCER SCREENING  2029       The additional following portions of the patient's history were reviewed and updated as appropriate: allergies, current medications, past family history, past medical history, past social history, past surgical history, and problem list.    Review of Systems    I have reviewed and agree with the HPI, ROS, and historical information as entered above. Alis Pedersen MD           Objective   /92   Wt 92.1 kg (203 lb)   LMP  (LMP Unknown)   BMI 29.55 kg/m²     Physical Exam  Vitals and nursing note reviewed. Exam conducted with a chaperone present.   Constitutional:       Appearance: She is well-developed.    HENT:      Head: Normocephalic and atraumatic.   Eyes:      Pupils: Pupils are equal, round, and reactive to light.   Neck:      Thyroid: No thyroid mass or thyromegaly.   Pulmonary:      Effort: Pulmonary effort is normal. No retractions.   Chest:      Chest wall: No mass.   Breasts:     Right: Normal. No mass, nipple discharge, skin change or tenderness.      Left: Normal. No mass, nipple discharge, skin change or tenderness.   Abdominal:      General: Bowel sounds are normal.      Palpations: Abdomen is soft. Abdomen is not rigid. There is no mass.      Tenderness: There is no abdominal tenderness. There is no guarding.      Hernia: No hernia is present. There is no hernia in the left inguinal area or right inguinal area.   Genitourinary:     Exam position: Lithotomy position.      Pubic Area: No rash.       Labia:         Right: No rash, tenderness or lesion.         Left: No rash, tenderness or lesion.       Urethra: No urethral pain or urethral swelling.      Vagina: Normal. No vaginal discharge or lesions.      Cervix: No cervical motion tenderness, discharge, lesion or cervical bleeding.      Uterus: Normal. Not enlarged, not fixed and not tender.       Adnexa:         Right: No mass, tenderness or fullness.          Left: No mass, tenderness or fullness.        Rectum: No external hemorrhoid.   Musculoskeletal:      Cervical back: Normal range of motion. No muscular tenderness.      Right lower leg: No edema.      Left lower leg: No edema.   Skin:     General: Skin is warm and dry.   Neurological:      Mental Status: She is alert and oriented to person, place, and time.      Motor: Motor function is intact.   Psychiatric:         Mood and Affect: Mood and affect normal.         Behavior: Behavior normal.         Assessment and Plan         Problem List Items Addressed This Visit    None  Visit Diagnoses       Women's annual routine gynecological examination    -  Primary    Relevant Orders    Mammo  Screening Digital Tomosynthesis Bilateral With CAD    Ambulatory Referral to Physical Therapy for Evaluation & Treatment    Urinary incontinence, unspecified type        Relevant Orders    Mammo Screening Digital Tomosynthesis Bilateral With CAD    Ambulatory Referral to Physical Therapy for Evaluation & Treatment    Encounter for screening mammogram for malignant neoplasm of breast        Relevant Orders    Mammo Screening Digital Tomosynthesis Bilateral With CAD            Reviewed monthly self breast exams.  Instructed to call with lumps, pain, or breast discharge.  Yearly mammograms ordered.  Recommended use of Vitamin D and getting adequate calcium in her diet. (1500mg)  Reviewed exercise as a preventative health measures.   Referral made for Pelvic Floor Physical Therapy  Return in about 2 years (around 10/25/2026) for Annual physical.    Alis Pedersen MD  10/25/2024

## 2024-10-28 LAB — REF LAB TEST METHOD: NORMAL

## 2024-11-07 ENCOUNTER — HOSPITAL ENCOUNTER (OUTPATIENT)
Dept: MAMMOGRAPHY | Facility: HOSPITAL | Age: 69
Discharge: HOME | End: 2024-11-07
Admitting: INTERNAL MEDICINE
Payer: MEDICARE

## 2024-11-07 DIAGNOSIS — Z12.31 VISIT FOR SCREENING MAMMOGRAM: ICD-10-CM

## 2024-11-07 PROCEDURE — 77067 SCR MAMMO BI INCL CAD: CPT

## 2024-11-07 PROCEDURE — 77063 BREAST TOMOSYNTHESIS BI: CPT

## 2024-11-15 DIAGNOSIS — M48.061 SPINAL STENOSIS OF LUMBAR REGION AT MULTIPLE LEVELS: Primary | ICD-10-CM

## 2024-11-15 DIAGNOSIS — M51.362 DEGENERATION OF INTERVERTEBRAL DISC OF LUMBAR REGION WITH DISCOGENIC BACK PAIN AND LOWER EXTREMITY PAIN: ICD-10-CM

## 2024-12-02 ENCOUNTER — TELEPHONE (OUTPATIENT)
Dept: NEUROSURGERY | Facility: CLINIC | Age: 69
End: 2024-12-02
Payer: MEDICARE

## 2024-12-02 NOTE — TELEPHONE ENCOUNTER
"Caller: Talia Maddox \"Christopher\"    Relationship: Self    Best call back number: [phone]    What specialty or service is being requested: PT    What is the provider, practice or medical service name: [facility]    What is the office location: [facility]    Any additional details: THERE IS SOME CONFUSION WITH PATIENT'S REFERRAL TO PT, AS SHE APPEARS TO HAVE BEEN REFERRED TO [facility] FOR LUMBAR, BUT IS SEEING [facility] FOR CERVICAL. SHE WOULD LIKE TO SEE [facility] FOR LUMBAR, AS WELL (UNLESS THERE IS A SPECIFIC REASON DR MCDANIELS REQUESTED [facility]). PLEASE CALL PATIENT TO ADVISE/UPDATE.  "

## 2024-12-04 NOTE — TELEPHONE ENCOUNTER
Has anyone sent this PT order to [facility] yet? If so please acknowledge it in this encounter and close it.

## 2024-12-16 ENCOUNTER — TRANSCRIBE ORDERS (OUTPATIENT)
Dept: ADMINISTRATIVE | Facility: HOSPITAL | Age: 69
End: 2024-12-16
Payer: MEDICARE

## 2024-12-16 DIAGNOSIS — R92.8 ABNORMAL MAMMOGRAM: Primary | ICD-10-CM

## 2024-12-30 ENCOUNTER — HOSPITAL ENCOUNTER (OUTPATIENT)
Dept: MAMMOGRAPHY | Facility: HOSPITAL | Age: 69
Discharge: HOME | End: 2024-12-30
Admitting: RADIOLOGY
Payer: MEDICARE

## 2024-12-30 DIAGNOSIS — R92.8 ABNORMAL MAMMOGRAM: ICD-10-CM

## 2024-12-30 LAB
NCCN CRITERIA FLAG: ABNORMAL
TYRER CUZICK SCORE: 9.8

## 2024-12-30 PROCEDURE — G0279 TOMOSYNTHESIS, MAMMO: HCPCS

## 2024-12-30 PROCEDURE — 77065 DX MAMMO INCL CAD UNI: CPT | Performed by: RADIOLOGY

## 2024-12-30 PROCEDURE — G0279 TOMOSYNTHESIS, MAMMO: HCPCS | Performed by: RADIOLOGY

## 2024-12-30 PROCEDURE — 77065 DX MAMMO INCL CAD UNI: CPT

## 2025-01-07 ENCOUNTER — TRANSCRIBE ORDERS (OUTPATIENT)
Dept: OBSTETRICS AND GYNECOLOGY | Facility: CLINIC | Age: 70
End: 2025-01-07
Payer: MEDICARE

## 2025-01-07 DIAGNOSIS — Z12.31 VISIT FOR SCREENING MAMMOGRAM: Primary | ICD-10-CM

## 2025-01-14 ENCOUNTER — DOCUMENTATION (OUTPATIENT)
Dept: GENETICS | Facility: HOSPITAL | Age: 70
End: 2025-01-14
Payer: MEDICARE

## 2025-02-13 ENCOUNTER — TELEPHONE (OUTPATIENT)
Dept: OBSTETRICS AND GYNECOLOGY | Facility: CLINIC | Age: 70
End: 2025-02-13
Payer: MEDICARE

## 2025-02-13 DIAGNOSIS — B00.9 HSV (HERPES SIMPLEX VIRUS) INFECTION: Primary | ICD-10-CM

## 2025-02-13 RX ORDER — VALACYCLOVIR HYDROCHLORIDE 1 G/1
1000 TABLET, FILM COATED ORAL 2 TIMES DAILY
Qty: 10 TABLET | Refills: 3 | Status: SHIPPED | OUTPATIENT
Start: 2025-02-13

## 2025-02-13 NOTE — TELEPHONE ENCOUNTER
Pt is requesting refill on;    valACYclovir (Valtrex) 1000 MG tablet     and is asking if she can get a couple of refills for it

## 2025-04-02 ENCOUNTER — LAB (OUTPATIENT)
Facility: HOSPITAL | Age: 70
End: 2025-04-02
Payer: MEDICARE

## 2025-04-02 ENCOUNTER — OFFICE VISIT (OUTPATIENT)
Age: 70
End: 2025-04-02
Payer: MEDICARE

## 2025-04-02 VITALS
HEIGHT: 70 IN | BODY MASS INDEX: 29.35 KG/M2 | DIASTOLIC BLOOD PRESSURE: 84 MMHG | SYSTOLIC BLOOD PRESSURE: 120 MMHG | WEIGHT: 205 LBS | HEART RATE: 73 BPM | TEMPERATURE: 98 F

## 2025-04-02 DIAGNOSIS — M19.072 PRIMARY OSTEOARTHRITIS OF BOTH FEET: ICD-10-CM

## 2025-04-02 DIAGNOSIS — M48.02 CERVICAL SPINAL STENOSIS: ICD-10-CM

## 2025-04-02 DIAGNOSIS — M47.816 LUMBAR SPONDYLOSIS: ICD-10-CM

## 2025-04-02 DIAGNOSIS — M15.9 GENERALIZED OSTEOARTHRITIS: ICD-10-CM

## 2025-04-02 DIAGNOSIS — M19.071 PRIMARY OSTEOARTHRITIS OF BOTH FEET: ICD-10-CM

## 2025-04-02 DIAGNOSIS — M19.041 PRIMARY OSTEOARTHRITIS OF BOTH HANDS: ICD-10-CM

## 2025-04-02 DIAGNOSIS — M15.9 GENERALIZED OSTEOARTHRITIS: Primary | ICD-10-CM

## 2025-04-02 DIAGNOSIS — M19.042 PRIMARY OSTEOARTHRITIS OF BOTH HANDS: ICD-10-CM

## 2025-04-02 DIAGNOSIS — Z79.1 LONG TERM (CURRENT) USE OF NON-STEROIDAL ANTI-INFLAMMATORIES (NSAID): ICD-10-CM

## 2025-04-02 LAB
BASOPHILS # BLD AUTO: 0.03 10*3/MM3 (ref 0–0.2)
BASOPHILS NFR BLD AUTO: 0.5 % (ref 0–1.5)
DEPRECATED RDW RBC AUTO: 41.4 FL (ref 37–54)
EOSINOPHIL # BLD AUTO: 0.14 10*3/MM3 (ref 0–0.4)
EOSINOPHIL NFR BLD AUTO: 2.2 % (ref 0.3–6.2)
ERYTHROCYTE [DISTWIDTH] IN BLOOD BY AUTOMATED COUNT: 12.8 % (ref 12.3–15.4)
ERYTHROCYTE [SEDIMENTATION RATE] IN BLOOD: 5 MM/HR (ref 0–30)
HCT VFR BLD AUTO: 41.4 % (ref 34–46.6)
HGB BLD-MCNC: 13.2 G/DL (ref 12–15.9)
IMM GRANULOCYTES # BLD AUTO: 0.03 10*3/MM3 (ref 0–0.05)
IMM GRANULOCYTES NFR BLD AUTO: 0.5 % (ref 0–0.5)
LYMPHOCYTES # BLD AUTO: 1.26 10*3/MM3 (ref 0.7–3.1)
LYMPHOCYTES NFR BLD AUTO: 19.9 % (ref 19.6–45.3)
MCH RBC QN AUTO: 28.3 PG (ref 26.6–33)
MCHC RBC AUTO-ENTMCNC: 31.9 G/DL (ref 31.5–35.7)
MCV RBC AUTO: 88.7 FL (ref 79–97)
MONOCYTES # BLD AUTO: 0.7 10*3/MM3 (ref 0.1–0.9)
MONOCYTES NFR BLD AUTO: 11 % (ref 5–12)
NEUTROPHILS NFR BLD AUTO: 4.18 10*3/MM3 (ref 1.7–7)
NEUTROPHILS NFR BLD AUTO: 65.9 % (ref 42.7–76)
NRBC BLD AUTO-RTO: 0 /100 WBC (ref 0–0.2)
PLATELET # BLD AUTO: 312 10*3/MM3 (ref 140–450)
PMV BLD AUTO: 9.4 FL (ref 6–12)
RBC # BLD AUTO: 4.67 10*6/MM3 (ref 3.77–5.28)
WBC NRBC COR # BLD AUTO: 6.34 10*3/MM3 (ref 3.4–10.8)

## 2025-04-02 PROCEDURE — 85025 COMPLETE CBC W/AUTO DIFF WBC: CPT

## 2025-04-02 PROCEDURE — 36415 COLL VENOUS BLD VENIPUNCTURE: CPT

## 2025-04-02 PROCEDURE — 86140 C-REACTIVE PROTEIN: CPT

## 2025-04-02 PROCEDURE — 85652 RBC SED RATE AUTOMATED: CPT

## 2025-04-02 PROCEDURE — 80053 COMPREHEN METABOLIC PANEL: CPT

## 2025-04-02 NOTE — ASSESSMENT & PLAN NOTE
Celebrex  Risks of nonsteroidal anti-inflammatory drugs discussed including GI upset, GI bleeding, renal and hepatic risk, and the risks of cardiovascular disease.  Warned not to take with other NSAIDs including over-the-counter NSAIDs.    Orders:    Comprehensive Metabolic Panel; Future    CBC Auto Differential; Future    
Longstanding degenerative changes of the hands and feet.  Chronic neck and back pain with significant worsening in 2024.  Review of the MRI shows spinal stenosis and foraminal stenosis in the cervical and lumbar areas, most significant in the cervical region.    She appears to have generalized osteoarthritis but is symptomatically worse in the neck and back.  I informed her there are no medications to slow or reverse osteoarthritis.  I suggested she stop the Celebrex for a few days to see if it is making any difference.  If it does seem to be helping, I told her she could try increasing to 200 mg of Celebrex daily.  I will check CMP and CBC to make sure NSAIDs are safe for the patient.  I reviewed the cervical and lumbar MRI and we discussed the results at length.  I will see her in follow-up in 3 months.  Orders:    Comprehensive Metabolic Panel; Future    CBC Auto Differential; Future    Sedimentation Rate; Future    C-reactive Protein; Future    
Pain is relatively low-grade but her deformities have progressed.  I informed her that the medication will not slow the deformities or reverse them.       
She has multiple level degenerative changes with some foraminal stenosis.  She has the same options as she does with her cervical spinal stenosis.  She will consider this and we can discuss this further on follow-up.       
She has significant Heberden's nodes.  I informed her that medications will not make an impact on the deformities.  She can use the nonsteroidal anti-inflammatory drugs as needed for pain.       
She has what appears to be fairly severe spinal stenosis in the cervical spine.  We discussed options.  She could continue physical therapy.  She could do the higher dose of Celebrex.  We could refer her to a pain center for injection therapy.  I could refer her back to neurosurgery.  She would be interested in a second opinion.  She would like to consider these options.  We can discuss this further on follow-up in 3 months.       
This document is complete and the patient is ready for discharge.

## 2025-04-02 NOTE — PROGRESS NOTES
Office Visit       Date: 04/02/2025   Patient Name: Talia Maddox  MRN: [id number]  YOB: 1955    Referring Physician: Sharda Valentine MD     Chief Complaint: Osteoarthritis    History of Present Illness: Talia Maddox is a 70 y.o. female who is here today in consultation for osteoarthritis.  I have followed the patient long-term until 2017 when she was lost to follow-up.  She had generalized osteoarthritis.  Testing was negative for inflammatory disease.    Since I last saw her, her major complaints have been from her neck and low back.  In early 2024, she had severe pain in her neck with decreased range of motion and pain in the left upper arm with numbness in the left forearm and left hand.  CT scan at that time showed severe cervical spinal stenosis with foraminal stenosis.  She saw a neurosurgeon who tried physical therapy and that helped greatly.  The neck pain reduced and the neurologic symptoms improved greatly.    In summer 2024, she began having back pain radiating to her trochanteric region and down her legs to her ankles.  This became severe and she had difficulty doing day-to-day activities.  She had an MRI which showed multilevel degenerative disease and foraminal stenosis and spinal stenosis.  She did physical therapy with significant improvement of the radicular symptoms but continued back pain.    She is also noticed progressive deformities of her feet and hands.  Pain is relatively low-grade in those areas but she is concerned with the bony enlargement of her DIP joints in her hands and her PIP joints in her feet.    She has done physical therapy for her symptoms.  She gets regular massage.  She takes Celebrex 100 mg daily.  She is unsure if this gives relief.  She has taken this dose chronically.  She tolerates it well.    She has never seen a pain center.  She has never tried injections in her back.    She does note that she is  planning to see a second neurosurgeon in the same practice at [facility] neurosurgery.  She had some personality conflicts with the first and would like a second opinion.      Subjective   Review of Systems: Review of Systems   Constitutional:  Negative for chills, fatigue, fever and unexpected weight loss.   HENT:  Negative for dental problem, mouth sores, sinus pressure and swollen glands.    Eyes:  Negative for photophobia, pain and redness.   Respiratory:  Negative for apnea, cough, chest tightness and shortness of breath.    Cardiovascular:  Negative for chest pain and leg swelling.   Gastrointestinal:  Positive for constipation and diarrhea. Negative for abdominal pain, nausea and GERD.   Genitourinary:  Negative for dysuria and hematuria.   Musculoskeletal:  Positive for back pain, joint swelling, neck pain and neck stiffness.   Skin:  Positive for dry skin. Negative for rash, skin lesions and bruise.   Neurological:  Negative for dizziness, seizures, syncope, weakness, headache and memory problem.   Hematological:  Negative for adenopathy. Does not bruise/bleed easily.   Psychiatric/Behavioral:  Positive for depressed mood. Negative for sleep disturbance, suicidal ideas and stress. The patient is not nervous/anxious.         Past Medical History:   Past Medical History:   Diagnosis Date    Arthritis     Depression     HSV infection     Lymphocytic colitis     Osteoarthritis     Sciatica     Thyroid disease        Past Surgical History:   Past Surgical History:   Procedure Laterality Date    APPENDECTOMY  1987    BREAST BIOPSY Left 2015    stereo bx     SECTION         Family History:   Family History   Problem Relation Age of Onset    Arthritis Mother     Alzheimer's disease Mother     Emphysema Father     Osteoporosis Sister     Arthritis Sister     Breast cancer Sister         early 50's    Hypertension Sister     Ovarian cancer Neg Hx        Social History:   Social History  "    Socioeconomic History    Marital status:    Tobacco Use    Smoking status: Never     Passive exposure: Never    Smokeless tobacco: Never   Vaping Use    Vaping status: Never Used   Substance and Sexual Activity    Alcohol use: Not Currently     Comment: rarely    Drug use: No    Sexual activity: Not Currently     Partners: Male     Birth control/protection: Post-menopausal       Medications:   Current Outpatient Medications:     celecoxib (CeleBREX) 100 MG capsule, Take 1 capsule by mouth Daily., Disp: , Rfl:     escitalopram (LEXAPRO) 20 MG tablet, Take 1 tablet by mouth Daily., Disp: , Rfl:     levothyroxine (SYNTHROID, LEVOTHROID) 112 MCG tablet, Take 1 tablet by mouth Daily., Disp: , Rfl:     traZODone (DESYREL) 50 MG tablet, Take 1 tablet by mouth Every Night., Disp: , Rfl:     valACYclovir (Valtrex) 1000 MG tablet, Take 1 tablet by mouth 2 (Two) Times a Day., Disp: 10 tablet, Rfl: 3    amitriptyline (ELAVIL) 10 MG tablet, Take 1 tablet by mouth Every Night., Disp: , Rfl:     Allergies:   Allergies   Allergen Reactions    Poison Ivy Extract Itching       I have reviewed and updated the patient's chief complaint, history of present illness, review of systems, past medical history, surgical history, family history, social history, medications and allergy list as appropriate.     Objective    Vital Signs:   Vitals:    04/02/25 1338   BP: 120/84   Pulse: 73   Temp: 98 °F (36.7 °C)   Weight: 93 kg (205 lb)   Height: 177.8 cm (70\")   PainSc: 8    PainLoc: Generalized     Body mass index is 29.41 kg/m².     Physical Exam:  General: The patient is well-developed and well nourished. Cooperative, alert and oriented x3. Affect is normal. Hydration appears normal.   HEENT: Normocephalic and atraumatic. No notable alopecia. Lids and conjunctiva are normal. Pupils are equal and sclera are clear. Oropharynx is clear   NECK: Supple without adenopathy, masses or thyromegaly.   CARDIOVASCULAR: Regular rate and rhythm. " No murmurs, rubs or gallops   LUNGS: Effort is normal. Lungs are clear bilaterally.  ABDOMEN: Soft and non-tender without masses or hepatosplenomegaly..   EXTREMITIES: No edema.  No cyanosis or clubbing.  SKIN: Inspection and palpation are normal.  I see no rashes, psoriatic lesions, nail pits, tophi, or nodules.  NEUROLOGIC: Gait is normal.  Deep tendon reflexes are 2+ and symmetric.  MUSCULOSKELETAL: Complete joint exam was performed. There was full range of motion of the shoulders, elbows, wrists and hands without soft tissue swelling synovitis or deformities except as noted.  There is squaring of the first CMC joint bilaterally.  Heberden's nodes are present diffusely.  Hips have good flexion and internal and external rotation.  Knees have no palpable effusions.  There is full extension and flexion.  Crepitus is present.  Ankles and feet have no soft tissue swelling or synovitis.  Marked degenerative changes are seen in multiple toes with some degenerative cock-up deformities.  BACK:  Straight without scoliosis.  There is decreased range of motion of the cervical spine.    Assessment / Plan        Assessment & Plan  Generalized osteoarthritis  Longstanding degenerative changes of the hands and feet.  Chronic neck and back pain with significant worsening in 2024.  Review of the MRI shows spinal stenosis and foraminal stenosis in the cervical and lumbar areas, most significant in the cervical region.    She appears to have generalized osteoarthritis but is symptomatically worse in the neck and back.  I informed her there are no medications to slow or reverse osteoarthritis.  I suggested she stop the Celebrex for a few days to see if it is making any difference.  If it does seem to be helping, I told her she could try increasing to 200 mg of Celebrex daily.  I will check CMP and CBC to make sure NSAIDs are safe for the patient.  I reviewed the cervical and lumbar MRI and we discussed the results at length.  I will  see her in follow-up in 3 months.  Orders:    Comprehensive Metabolic Panel; Future    CBC Auto Differential; Future    Sedimentation Rate; Future    C-reactive Protein; Future    Primary osteoarthritis of both hands  She has significant Heberden's nodes.  I informed her that medications will not make an impact on the deformities.  She can use the nonsteroidal anti-inflammatory drugs as needed for pain.       Cervical spinal stenosis  She has what appears to be fairly severe spinal stenosis in the cervical spine.  We discussed options.  She could continue physical therapy.  She could do the higher dose of Celebrex.  We could refer her to a pain center for injection therapy.  I could refer her back to neurosurgery.  She would be interested in a second opinion.  She would like to consider these options.  We can discuss this further on follow-up in 3 months.       Lumbar spondylosis  She has multiple level degenerative changes with some foraminal stenosis.  She has the same options as she does with her cervical spinal stenosis.  She will consider this and we can discuss this further on follow-up.       Primary osteoarthritis of both feet  Pain is relatively low-grade but her deformities have progressed.  I informed her that the medication will not slow the deformities or reverse them.       Long term (current) use of non-steroidal anti-inflammatories (nsaid)  Celebrex  Risks of nonsteroidal anti-inflammatory drugs discussed including GI upset, GI bleeding, renal and hepatic risk, and the risks of cardiovascular disease.  Warned not to take with other NSAIDs including over-the-counter NSAIDs.    Orders:    Comprehensive Metabolic Panel; Future    CBC Auto Differential; Future         Follow Up:   Return in about 3 months (around 7/2/2025).        Rancho Allen MD  [facility] [place]

## 2025-04-03 LAB
ALBUMIN SERPL-MCNC: 4.1 G/DL (ref 3.5–5.2)
ALBUMIN/GLOB SERPL: 1.5 G/DL
ALP SERPL-CCNC: 95 U/L (ref 39–117)
ALT SERPL W P-5'-P-CCNC: 17 U/L (ref 1–33)
ANION GAP SERPL CALCULATED.3IONS-SCNC: 12.6 MMOL/L (ref 5–15)
AST SERPL-CCNC: 13 U/L (ref 1–32)
BILIRUB SERPL-MCNC: 0.3 MG/DL (ref 0–1.2)
BUN SERPL-MCNC: 10 MG/DL (ref 8–23)
BUN/CREAT SERPL: 12.3 (ref 7–25)
CALCIUM SPEC-SCNC: 9.7 MG/DL (ref 8.6–10.5)
CHLORIDE SERPL-SCNC: 99 MMOL/L (ref 98–107)
CO2 SERPL-SCNC: 25.4 MMOL/L (ref 22–29)
CREAT SERPL-MCNC: 0.81 MG/DL (ref 0.57–1)
CRP SERPL-MCNC: 0.39 MG/DL (ref 0–0.5)
EGFRCR SERPLBLD CKD-EPI 2021: 77.2 ML/MIN/1.73
GLOBULIN UR ELPH-MCNC: 2.8 GM/DL
GLUCOSE SERPL-MCNC: 88 MG/DL (ref 65–99)
POTASSIUM SERPL-SCNC: 4.3 MMOL/L (ref 3.5–5.2)
PROT SERPL-MCNC: 6.9 G/DL (ref 6–8.5)
SODIUM SERPL-SCNC: 137 MMOL/L (ref 136–145)

## 2025-04-28 ENCOUNTER — TELEPHONE (OUTPATIENT)
Age: 70
End: 2025-04-28
Payer: MEDICARE

## 2025-04-28 NOTE — TELEPHONE ENCOUNTER
Pt called and requested a refill for the Celebrex 200mg 1 po qd.  She said she asked her PCP for the refill, but was told since you suggested increasing it, you would need to be the one to refill.  OK to send in?  Pt would like it sent to [facility] for a 90-day supply.  Her number is [phone]. -ALEJANDRO Rodriguez

## 2025-04-29 RX ORDER — CELECOXIB 200 MG/1
200 CAPSULE ORAL DAILY PRN
Qty: 90 CAPSULE | Refills: 1 | Status: SHIPPED | OUTPATIENT
Start: 2025-04-29

## 2025-05-20 ENCOUNTER — TELEPHONE (OUTPATIENT)
Dept: NEUROSURGERY | Facility: CLINIC | Age: 70
End: 2025-05-20

## 2025-05-20 NOTE — TELEPHONE ENCOUNTER
Caller: OLIVER    Relationship: SELF    Best call back number: [phone]    What was the call regarding: PATIENT CALLED STATING SHE HAD TALKED TO SOMEONE ABOUT 3-4 WKS AGO ABOUT SWITCHING FROM DR MCDANIELS TO DR SCOTT - STATES SHE DOES NOT KNOW WHO SHE TALKED TO - DID NOT SEE ANY NOTES IN THE CHART ABOUT SWITCHING - I DID ADVISE THAT THE [facility] USUALLY DO NOT SHARE PATIENT BUT CAN SEND FOR REVIEW      PATIENT IS CURRENTLY EST WITH DR MCDANIELS FOR LUMBAR AND CERVICAL - LAST SEEN 10.22.24    PLEASE ADVISE  THANK YOU

## 2025-05-21 NOTE — TELEPHONE ENCOUNTER
This will have to be addressed by both Dr. Welsh and Dr. Leon.  We will bring it to their attention.